# Patient Record
Sex: FEMALE
[De-identification: names, ages, dates, MRNs, and addresses within clinical notes are randomized per-mention and may not be internally consistent; named-entity substitution may affect disease eponyms.]

---

## 2018-01-25 PROBLEM — Z00.00 ENCOUNTER FOR PREVENTIVE HEALTH EXAMINATION: Status: ACTIVE | Noted: 2018-01-25

## 2018-03-13 ENCOUNTER — RESULT REVIEW (OUTPATIENT)
Age: 43
End: 2018-03-13

## 2019-03-27 ENCOUNTER — RESULT REVIEW (OUTPATIENT)
Age: 44
End: 2019-03-27

## 2021-01-14 ENCOUNTER — RESULT REVIEW (OUTPATIENT)
Age: 46
End: 2021-01-14

## 2025-04-27 ENCOUNTER — EMERGENCY (EMERGENCY)
Facility: HOSPITAL | Age: 50
LOS: 0 days | Discharge: HOME | End: 2025-04-27
Attending: EMERGENCY MEDICINE
Payer: COMMERCIAL

## 2025-04-27 VITALS
TEMPERATURE: 98 F | RESPIRATION RATE: 15 BRPM | OXYGEN SATURATION: 99 % | WEIGHT: 145.06 LBS | DIASTOLIC BLOOD PRESSURE: 70 MMHG | SYSTOLIC BLOOD PRESSURE: 116 MMHG | HEART RATE: 73 BPM | HEIGHT: 67 IN

## 2025-04-27 VITALS
SYSTOLIC BLOOD PRESSURE: 115 MMHG | RESPIRATION RATE: 18 BRPM | TEMPERATURE: 98 F | OXYGEN SATURATION: 100 % | DIASTOLIC BLOOD PRESSURE: 74 MMHG | HEART RATE: 69 BPM

## 2025-04-27 DIAGNOSIS — M25.522 PAIN IN LEFT ELBOW: ICD-10-CM

## 2025-04-27 DIAGNOSIS — M79.632 PAIN IN LEFT FOREARM: ICD-10-CM

## 2025-04-27 DIAGNOSIS — M54.12 RADICULOPATHY, CERVICAL REGION: ICD-10-CM

## 2025-04-27 LAB
ALBUMIN SERPL ELPH-MCNC: 3.4 G/DL — SIGNIFICANT CHANGE UP (ref 3.3–5)
ALP SERPL-CCNC: 77 U/L — SIGNIFICANT CHANGE UP (ref 40–120)
ALT FLD-CCNC: 15 U/L — SIGNIFICANT CHANGE UP (ref 12–78)
ANION GAP SERPL CALC-SCNC: 0 MMOL/L — LOW (ref 5–17)
APTT BLD: 28.2 SEC — SIGNIFICANT CHANGE UP (ref 26.1–36.8)
AST SERPL-CCNC: 15 U/L — SIGNIFICANT CHANGE UP (ref 15–37)
BASOPHILS # BLD AUTO: 0.06 K/UL — SIGNIFICANT CHANGE UP (ref 0–0.2)
BASOPHILS NFR BLD AUTO: 1.6 % — SIGNIFICANT CHANGE UP (ref 0–2)
BILIRUB SERPL-MCNC: 1.4 MG/DL — HIGH (ref 0.2–1.2)
BUN SERPL-MCNC: 11 MG/DL — SIGNIFICANT CHANGE UP (ref 7–23)
CALCIUM SERPL-MCNC: 9 MG/DL — SIGNIFICANT CHANGE UP (ref 8.5–10.1)
CHLORIDE SERPL-SCNC: 108 MMOL/L — SIGNIFICANT CHANGE UP (ref 96–108)
CO2 SERPL-SCNC: 29 MMOL/L — SIGNIFICANT CHANGE UP (ref 22–31)
CREAT SERPL-MCNC: 0.71 MG/DL — SIGNIFICANT CHANGE UP (ref 0.5–1.3)
EGFR: 103 ML/MIN/1.73M2 — SIGNIFICANT CHANGE UP
EGFR: 103 ML/MIN/1.73M2 — SIGNIFICANT CHANGE UP
EOSINOPHIL # BLD AUTO: 0.24 K/UL — SIGNIFICANT CHANGE UP (ref 0–0.5)
EOSINOPHIL NFR BLD AUTO: 6.6 % — HIGH (ref 0–6)
GLUCOSE SERPL-MCNC: 96 MG/DL — SIGNIFICANT CHANGE UP (ref 70–99)
HCT VFR BLD CALC: 35.7 % — SIGNIFICANT CHANGE UP (ref 34.5–45)
HGB BLD-MCNC: 12 G/DL — SIGNIFICANT CHANGE UP (ref 11.5–15.5)
IMM GRANULOCYTES NFR BLD AUTO: 0.3 % — SIGNIFICANT CHANGE UP (ref 0–0.9)
INR BLD: 0.99 RATIO — SIGNIFICANT CHANGE UP (ref 0.85–1.16)
LYMPHOCYTES # BLD AUTO: 1.91 K/UL — SIGNIFICANT CHANGE UP (ref 1–3.3)
LYMPHOCYTES # BLD AUTO: 52.3 % — HIGH (ref 13–44)
MCHC RBC-ENTMCNC: 30.2 PG — SIGNIFICANT CHANGE UP (ref 27–34)
MCHC RBC-ENTMCNC: 33.6 G/DL — SIGNIFICANT CHANGE UP (ref 32–36)
MCV RBC AUTO: 89.7 FL — SIGNIFICANT CHANGE UP (ref 80–100)
MONOCYTES # BLD AUTO: 0.37 K/UL — SIGNIFICANT CHANGE UP (ref 0–0.9)
MONOCYTES NFR BLD AUTO: 10.1 % — SIGNIFICANT CHANGE UP (ref 2–14)
NEUTROPHILS # BLD AUTO: 1.06 K/UL — LOW (ref 1.8–7.4)
NEUTROPHILS NFR BLD AUTO: 29.1 % — LOW (ref 43–77)
NRBC BLD AUTO-RTO: 0 /100 WBCS — SIGNIFICANT CHANGE UP (ref 0–0)
PLATELET # BLD AUTO: 290 K/UL — SIGNIFICANT CHANGE UP (ref 150–400)
POTASSIUM SERPL-MCNC: 4.8 MMOL/L — SIGNIFICANT CHANGE UP (ref 3.5–5.3)
POTASSIUM SERPL-SCNC: 4.8 MMOL/L — SIGNIFICANT CHANGE UP (ref 3.5–5.3)
PROT SERPL-MCNC: 7.1 GM/DL — SIGNIFICANT CHANGE UP (ref 6–8.3)
PROTHROM AB SERPL-ACNC: 11.5 SEC — SIGNIFICANT CHANGE UP (ref 9.9–13.4)
RBC # BLD: 3.98 M/UL — SIGNIFICANT CHANGE UP (ref 3.8–5.2)
RBC # FLD: 12 % — SIGNIFICANT CHANGE UP (ref 10.3–14.5)
SODIUM SERPL-SCNC: 137 MMOL/L — SIGNIFICANT CHANGE UP (ref 135–145)
TROPONIN I, HIGH SENSITIVITY RESULT: <3 NG/L — SIGNIFICANT CHANGE UP
TROPONIN I, HIGH SENSITIVITY RESULT: <3 NG/L — SIGNIFICANT CHANGE UP
WBC # BLD: 3.65 K/UL — LOW (ref 3.8–10.5)
WBC # FLD AUTO: 3.65 K/UL — LOW (ref 3.8–10.5)

## 2025-04-27 PROCEDURE — 99284 EMERGENCY DEPT VISIT MOD MDM: CPT

## 2025-04-27 PROCEDURE — 73080 X-RAY EXAM OF ELBOW: CPT | Mod: 26,LT

## 2025-04-27 PROCEDURE — 93010 ELECTROCARDIOGRAM REPORT: CPT

## 2025-04-27 PROCEDURE — 70490 CT SOFT TISSUE NECK W/O DYE: CPT | Mod: 26

## 2025-04-27 RX ORDER — METHOCARBAMOL 500 MG/1
750 TABLET, FILM COATED ORAL ONCE
Refills: 0 | Status: COMPLETED | OUTPATIENT
Start: 2025-04-27 | End: 2025-04-27

## 2025-04-27 RX ORDER — IBUPROFEN 200 MG
1 TABLET ORAL
Qty: 20 | Refills: 0
Start: 2025-04-27 | End: 2025-05-01

## 2025-04-27 RX ORDER — METHOCARBAMOL 500 MG/1
1 TABLET, FILM COATED ORAL
Qty: 15 | Refills: 0
Start: 2025-04-27 | End: 2025-05-01

## 2025-04-27 RX ORDER — KETOROLAC TROMETHAMINE 30 MG/ML
15 INJECTION, SOLUTION INTRAMUSCULAR; INTRAVENOUS ONCE
Refills: 0 | Status: DISCONTINUED | OUTPATIENT
Start: 2025-04-27 | End: 2025-04-27

## 2025-04-27 RX ADMIN — METHOCARBAMOL 750 MILLIGRAM(S): 500 TABLET, FILM COATED ORAL at 12:16

## 2025-04-27 RX ADMIN — KETOROLAC TROMETHAMINE 15 MILLIGRAM(S): 30 INJECTION, SOLUTION INTRAMUSCULAR; INTRAVENOUS at 12:15

## 2025-04-27 NOTE — ED PROVIDER NOTE - OBJECTIVE STATEMENT
49-year-old female with no significant past medical history otherwise presenting ER due to left upper extremity discomfort and pain for the last month.  Patient states that it has been on and off and feels that it has been traveling up to the left armpit area last night.  Patient states that she has not done anything strenuous and does not recall any fall or trauma.  There is some discomfort on left elbow movement noted.

## 2025-04-27 NOTE — ED PROVIDER NOTE - CARE PROVIDER_API CALL
Akosua Peña  Orthopaedic Surgery  [facility], [address] [address]  Phone: [phone]  Fax: [phone]  Follow Up Time: 1-3 Days

## 2025-04-27 NOTE — ED ADULT TRIAGE NOTE - CHIEF COMPLAINT QUOTE
pain to left elbow area radiating up to  arm pit and shoulders  x 2 weeks ago but getting worse and was unbearable last night.

## 2025-04-27 NOTE — ED PROVIDER NOTE - CLINICAL SUMMARY MEDICAL DECISION MAKING FREE TEXT BOX
Given patient with no notable source for strain and month-long symptoms will evaluate with labs and CT of cervical spine along with x-ray of left elbow given focal tenderness.  No cardiac findings on EKG and troponin negative left arm numbness/discomfort unlikely cardiac in nature.  Otherwise likely to be musculoskeletal as cause will recommend ibuprofen and Robaxin and treat for possible cervical radicular symptoms.  And recommend outpatient PMD/orthopedic follow-up with Dr. Peña

## 2025-04-27 NOTE — ED PROVIDER NOTE - PATIENT PORTAL LINK FT
You can access the FollowMyHealth Patient Portal offered by [facility] by registering at the following website: [email or web address]. By joining [facility]’s FollowMyHealth portal, you will also be able to view your health information using other applications (apps) compatible with our system.

## 2025-04-27 NOTE — ED PROVIDER NOTE - NSFOLLOWUPINSTRUCTIONS_ED_ALL_ED_FT
Cervical Radiculopathy  Close-up of the nerves of the cervical spine.  Cervical radiculopathy happens when a nerve in the neck (a cervical nerve) is pinched or bruised. This condition can happen because of an injury to the cervical spine (vertebrae) in the neck, or as part of the normal aging process. Pressure on the cervical nerves can cause pain or numbness that travels from the neck all the way down to the arm and fingers. This condition usually gets better with rest. Treatment may be needed if the condition does not improve.    What are the causes?  This condition may be caused by:  A neck injury.  A bulging (herniated) disk.  Muscle spasms.  Muscle tightness in the neck due to overuse.  Arthritis.  Breakdown or degeneration in the bones and joints of the spine (spondylosis) due to aging.  Bone spurs that may develop near the cervical nerves.  What are the signs or symptoms?  Symptoms of this condition include:  Pain. The pain may travel from the neck to the arm and hand. The pain can be severe or irritating. It may get worse when you move your neck.  Numbness or tingling in your arm or hand.  Weakness in the affected arm and hand, in severe cases.  How is this diagnosed?  This condition may be diagnosed based on your symptoms, your medical history, and a physical exam. You may also have tests, including:  X-rays.  CT scan.  MRI.  Electromyogram (EMG).  Nerve conduction tests.  How is this treated?  In many cases, treatment is not needed for this condition. With rest, the condition usually gets better over time. If treatment is needed, options may include:  Wearing a soft neck collar (cervical collar) for short periods of time.  Doing physical therapy to strengthen your neck muscles.  Taking medicines. These may include NSAIDs, such as ibuprofen, or oral corticosteroids.  Having spinal injections, in severe cases.  Having surgery. This may be needed if other treatments do not help. Different types of surgery may be done depending on the cause of this condition.  Follow these instructions at home:  If you have a cervical collar:    Wear it as told by your health care provider. Remove it only as told by your health care provider.  Ask your health care provider if you can remove the cervical collar for cleaning and bathing. If you are allowed to remove the collar for cleaning or bathing:  Follow instructions from your health care provider about how to remove the collar safely.  Clean the collar by wiping it with mild soap and water and drying it completely.  Take out any removable pads in the collar every 1–2 days, and wash them by hand with soap and water. Let them air-dry completely before you put them back in the collar.  Check your skin under the collar for irritation or sores. If you see any, tell your health care provider.  Managing pain    Bag of ice on a towel on the skin.   A heating pad for use on the painful area.  Take over-the-counter and prescription medicines only as told by your health care provider.  If directed, put ice on the affected area. To do this:  If you have a soft neck collar, remove it as told by your health care provider.  Put ice in a plastic bag.  Place a towel between your skin and the bag.  Leave the ice on for 20 minutes, 2–3 times a day.  Remove the ice if your skin turns bright red. This is very important. If you cannot feel pain, heat, or cold, you have a greater risk of damage to the area.  If applying ice does not help, you can try using heat. Use the heat source that your health care provider recommends, such as a moist heat pack or a heating pad.  Place a towel between your skin and the heat source.  Leave the heat on for 20–30 minutes.  Remove the heat if your skin turns bright red. This is especially important if you are unable to feel pain, heat, or cold. You have a greater risk of getting burned.  Try a gentle neck and shoulder massage to help relieve symptoms.  Activity    Rest as needed.  Return to your normal activities as told by your health care provider. Ask your health care provider what activities are safe for you.  Do stretching and strengthening exercises as told by your health care provider or your physical therapist.  You may have to avoid lifting. Ask your health care provider how much you can safely lift.  General instructions    Use a flat pillow when you sleep.  Do not drive while wearing a cervical collar. If you do not have a cervical collar, ask your health care provider if it is safe to drive while your neck heals.  Ask your health care provider if the medicine prescribed to you requires you to avoid driving or using machinery.  Do not use any products that contain nicotine or tobacco. These products include cigarettes, chewing tobacco, and vaping devices, such as e-cigarettes. If you need help quitting, ask your health care provider.  Keep all follow-up visits. This is important.  Contact a health care provider if:  Your condition does not improve with treatment.  Get help right away if:  Your pain gets much worse and is not controlled with medicines.  You have weakness or numbness in your hand, arm, face, or leg.  You have a high fever.  You have a stiff, rigid neck.  You lose control of your bowels or your bladder (have incontinence).  You have trouble with walking, balance, or speaking.  Summary  Cervical radiculopathy happens when a nerve in the neck is pinched or bruised.  A nerve can get pinched from a bulging disk, arthritis, muscle spasms, or an injury to the neck.  Symptoms include pain, tingling, or numbness radiating from the neck to the arm or hand. Weakness can also occur in severe cases.  Treatment may include rest, wearing a cervical collar, and physical therapy. Medicines may be prescribed to help with pain. In severe cases, injections or surgery may be needed.  This information is not intended to replace advice given to you by your health care provider. Make sure you discuss any questions you have with your health care provider.

## 2025-04-27 NOTE — ED PROVIDER NOTE - MUSCULOSKELETAL MINIMAL EXAM
left paracervical muscles increased tone, left elbow with focal tenderness on forearm/elbow, no swelling of extremity noted

## 2025-04-27 NOTE — ED ADULT NURSE NOTE - OBJECTIVE STATEMENT
pt is AOX4 present to the ED c/o pain to left elbow area radiating up to  arm pit and shoulders  for a month. states the pain is getting worse and was unbearable last night. no history

## 2025-04-28 ENCOUNTER — APPOINTMENT (OUTPATIENT)
Dept: ORTHOPEDIC SURGERY | Facility: CLINIC | Age: 50
End: 2025-04-28
Payer: COMMERCIAL

## 2025-04-28 VITALS — HEIGHT: 67 IN | BODY MASS INDEX: 22.76 KG/M2 | WEIGHT: 145 LBS

## 2025-04-28 DIAGNOSIS — Z78.9 OTHER SPECIFIED HEALTH STATUS: ICD-10-CM

## 2025-04-28 DIAGNOSIS — M54.12 RADICULOPATHY, CERVICAL REGION: ICD-10-CM

## 2025-04-28 PROCEDURE — 99203 OFFICE O/P NEW LOW 30 MIN: CPT

## 2025-04-28 RX ORDER — GABAPENTIN 300 MG/1
300 CAPSULE ORAL DAILY
Qty: 15 | Refills: 0 | Status: ACTIVE | COMMUNITY
Start: 2025-04-28 | End: 1900-01-01

## 2025-04-30 ENCOUNTER — APPOINTMENT (OUTPATIENT)
Dept: MRI IMAGING | Facility: CLINIC | Age: 50
End: 2025-04-30